# Patient Record
Sex: FEMALE | Race: WHITE | NOT HISPANIC OR LATINO | Employment: FULL TIME | URBAN - METROPOLITAN AREA
[De-identification: names, ages, dates, MRNs, and addresses within clinical notes are randomized per-mention and may not be internally consistent; named-entity substitution may affect disease eponyms.]

---

## 2024-07-29 ENCOUNTER — NURSE TRIAGE (OUTPATIENT)
Age: 39
End: 2024-07-29

## 2024-07-29 NOTE — TELEPHONE ENCOUNTER
"Patient calling reporting irregular bleeding 4-5 months now. LMP: 7/26/24. She states she goes through spotting to mild bleeding when she does bleed, soaking through 1-2 pads per day.  Instead of getting her menses x1 per month, she has been getting it more frequently for a shorter amount of time, menses lasting 2-4 days.  Reports irregular bleeding in between 10-14 days of her cycle. Denies any chance of pregnancy.  Advised patient bleeding/pain precautions.    Attempted to schedule an appointment to be further evaluated.  Unable to find an appt that fits patient's schedule.  Warm transferred patient to clerical staff to be further scheduled.  Patient verbalized understanding and voiced appreciation for phone call.       Reason for Disposition   Age > 38 years with irregular or excessive bleeding    Answer Assessment - Initial Assessment Questions  1. AMOUNT: \"Describe the bleeding that you are having.\"     - SPOTTING: spotting, or pinkish / brownish mucous discharge; does not fill panti-liner or pad     - MILD:  less than 1 pad / hour; less than patient's usual menstrual bleeding    - MODERATE: 1-2 pads / hour; 1 menstrual cup every 6 hours; small-medium blood clots (e.g., pea, grape, small coin)    - SEVERE: soaking 2 or more pads/hour for 2 or more hours; 1 menstrual cup every 2 hours; bleeding not contained by pads or continuous red blood from vagina; large blood clots (e.g., golf ball, large coin)       Spotting to mild   2. ONSET: \"When did the bleeding begin?\" \"Is it continuing now?\"      Every 2 weeks   3. MENSTRUAL PERIOD: \"When was the last normal menstrual period?\" \"How is this different than your period?\"      LMP: 7/26/24  4. REGULARITY: \"How regular are your periods?\"      YES  5. ABDOMINAL PAIN: \"Do you have any pain?\" \"How bad is the pain?\"  (e.g., Scale 1-10; mild, moderate, or severe)    - MILD (1-3): doesn't interfere with normal activities, abdomen soft and not tender to touch     - MODERATE " "(4-7): interferes with normal activities or awakens from sleep, tender to touch     - SEVERE (8-10): excruciating pain, doubled over, unable to do any normal activities       Denies   6. PREGNANCY: \"Could you be pregnant?\" \"Are you sexually active?\" \"Did you recently give birth?\"      Denies   7. BREASTFEEDING: \"Are you breastfeeding?\"      N/a   8. HORMONES: \"Are you taking any hormone medications, prescription or OTC?\" (e.g., birth control pills, estrogen)      Denies   9. BLOOD THINNERS: \"Do you take any blood thinners?\" (e.g., Coumadin/warfarin, Pradaxa/dabigatran, aspirin)      Denies   10. CAUSE: \"What do you think is causing the bleeding?\" (e.g., recent gyn surgery, recent gyn procedure; known bleeding disorder, cervical cancer, polycystic ovarian disease, fibroids)          PMMD   11. HEMODYNAMIC STATUS: \"Are you weak or feeling lightheaded?\" If Yes, ask: \"Can you stand and walk normally?\"         Denies   12. OTHER SYMPTOMS: \"What other symptoms are you having with the bleeding?\" (e.g., passed tissue, vaginal discharge, fever, menstrual-type cramps)        Denies    Protocols used: Vaginal Bleeding - Abnormal-ADULT-[place]    "

## 2024-07-31 DIAGNOSIS — R45.86 MOOD SWINGS: ICD-10-CM

## 2024-07-31 DIAGNOSIS — R45.4 ANGER REACTION: ICD-10-CM

## 2024-07-31 DIAGNOSIS — F33.0 MILD EPISODE OF RECURRENT MAJOR DEPRESSIVE DISORDER (HCC): ICD-10-CM

## 2024-07-31 RX ORDER — ESCITALOPRAM OXALATE 10 MG/1
10 TABLET ORAL DAILY
Qty: 90 TABLET | Refills: 1 | Status: SHIPPED | OUTPATIENT
Start: 2024-07-31

## 2024-07-31 NOTE — TELEPHONE ENCOUNTER
Reason for call:   [x] Refill   [] Prior Auth  [] Other:     Office:   [x] PCP/Provider - [facility] - Alyson PONCE   [] Specialty/Provider -     Medication: escitalopram (LEXAPRO) 10 mg tablet     Dose/Frequency: 10 mg, Daily     Quantity: 100     Pharmacy: [facility] #[id number]    Does the patient have enough for 3 days?   [x] Yes   [] No - Send as HP to POD

## 2024-08-30 ENCOUNTER — TELEPHONE (OUTPATIENT)
Dept: FAMILY MEDICINE CLINIC | Facility: CLINIC | Age: 39
End: 2024-08-30

## 2024-12-10 ENCOUNTER — TELEPHONE (OUTPATIENT)
Dept: FAMILY MEDICINE CLINIC | Facility: CLINIC | Age: 39
End: 2024-12-10

## 2025-01-22 ENCOUNTER — TELEPHONE (OUTPATIENT)
Dept: ADMINISTRATIVE | Facility: OTHER | Age: 40
End: 2025-01-22

## 2025-01-22 NOTE — TELEPHONE ENCOUNTER
----- Message from Mana CAREY sent at 1/21/2025  1:30 PM EST -----  Regarding: Care Gap request  01/21/25 1:30 PM    Hello, our patient attached above has had Pap Smear (HPV) aka Cervical Cancer Screening completed/performed. Please assist in updating the patient chart by pulling the Care Everywhere (CE) document. The date of service is 9/2024.     Thank you,  Mana Blair  [facility]

## 2025-01-22 NOTE — TELEPHONE ENCOUNTER
Upon review of the In Basket request we were able to locate, review, and update the patient chart as requested for Pap Smear (HPV) aka Cervical Cancer Screening.    Any additional questions or concerns should be emailed to the Practice Liaisons via the appropriate education email address, please do not reply via In Basket.    Thank you  NAYLA DRAPER MA   [facility]

## 2025-02-13 ENCOUNTER — TELEPHONE (OUTPATIENT)
Dept: FAMILY MEDICINE CLINIC | Facility: CLINIC | Age: 40
End: 2025-02-13

## 2025-02-13 NOTE — TELEPHONE ENCOUNTER
Patient attribution    Last seen 2022    Patient is now seen with Dr Lizbeth Grady , DO    Please update chart and remove our office as pcp

## 2025-03-28 ENCOUNTER — APPOINTMENT (OUTPATIENT)
Dept: RADIOLOGY | Facility: CLINIC | Age: 40
End: 2025-03-28
Payer: COMMERCIAL

## 2025-03-28 ENCOUNTER — OFFICE VISIT (OUTPATIENT)
Dept: URGENT CARE | Facility: CLINIC | Age: 40
End: 2025-03-28
Payer: COMMERCIAL

## 2025-03-28 VITALS
HEIGHT: 63 IN | WEIGHT: 230 LBS | OXYGEN SATURATION: 99 % | RESPIRATION RATE: 18 BRPM | BODY MASS INDEX: 40.75 KG/M2 | DIASTOLIC BLOOD PRESSURE: 82 MMHG | TEMPERATURE: 96.7 F | SYSTOLIC BLOOD PRESSURE: 126 MMHG | HEART RATE: 97 BPM

## 2025-03-28 DIAGNOSIS — M25.511 ACUTE PAIN OF RIGHT SHOULDER: Primary | ICD-10-CM

## 2025-03-28 DIAGNOSIS — M25.511 ACUTE PAIN OF RIGHT SHOULDER: ICD-10-CM

## 2025-03-28 PROCEDURE — 73030 X-RAY EXAM OF SHOULDER: CPT

## 2025-03-28 PROCEDURE — 99214 OFFICE O/P EST MOD 30 MIN: CPT

## 2025-03-28 RX ORDER — CYCLOBENZAPRINE HCL 5 MG
5 TABLET ORAL 3 TIMES DAILY PRN
Qty: 12 TABLET | Refills: 0 | Status: SHIPPED | OUTPATIENT
Start: 2025-03-28

## 2025-03-28 NOTE — PROGRESS NOTES
[facility]        NAME: Meri Phillips is a 39 y.o. female  : 1985    MRN: [id number]  DATE: 2025  TIME: 9:28 AM    Assessment and Plan   Acute pain of right shoulder [M25.511]  1. Acute pain of right shoulder  XR shoulder 2+ vw right    cyclobenzaprine (FLEXERIL) 5 mg tablet    Ambulatory Referral to Physical Therapy    Ambulatory Referral to Orthopedic Surgery        Preliminary XR read negative for acute osseous abnormality, final read pending. Muscle relaxer, caution advised. Encouraged continued supportive measures.  Referral placed to PT and Orthopedic Surgery. Follow up with PCP in 3-5 days or proceed to emergency department for worsening symptoms.  Patient verbalized understanding of instructions given.       Patient Instructions     Preliminary XR read negative, final read pending  Take muscle relaxer as prescribed but do not drink alcohol, drive your vehicle, or operate heavy machinery  OTC Tylenol/Ibuprofen  Heat/Ice  Topical analgesics  Referral placed to PT and Orthopedic Surgery   Follow up with PCP in 3-5 days.  Proceed to  ER if symptoms worsen.    If tests have been performed at [facility], our office will contact you with results if changes need to be made to the care plan discussed with you at the visit.  You can review your full results on [facility] MyChart.    Chief Complaint     Chief Complaint   Patient presents with    Shoulder Pain     Right shoulder pain. No injury. Hurts more with Movement         History of Present Illness       39-year-old female with no significant past medical history presents with complaints of right posterior shoulder pain x 1 week.  Denies specific known injury or trauma but reports waking up with symptoms.  Reports gradual worsening and pain only occurs with movement and exertion.  Unable to perform overhead movements.  Denies numbness, tingling, or weakness.  She is right-hand dominant.  She has been taking OTC Tylenol and applying  ice.    Shoulder Pain   Pertinent negatives include no fever or numbness.       Review of Systems   Review of Systems   Constitutional:  Negative for chills and fever.   Respiratory:  Negative for cough and shortness of breath.    Cardiovascular:  Negative for chest pain.   Gastrointestinal:  Negative for abdominal pain, diarrhea, nausea and vomiting.   Musculoskeletal:  Positive for arthralgias and myalgias. Negative for joint swelling.   Skin:  Negative for rash.   Neurological:  Negative for weakness and numbness.         Current Medications       Current Outpatient Medications:     cyclobenzaprine (FLEXERIL) 5 mg tablet, Take 1 tablet (5 mg total) by mouth 3 (three) times a day as needed for muscle spasms, Disp: 12 tablet, Rfl: 0    escitalopram (LEXAPRO) 10 mg tablet, Take 1 tablet (10 mg total) by mouth daily, Disp: 90 tablet, Rfl: 1    FeroSul 325 (65 Fe) MG tablet, take 1 tablet by mouth twice a day before meals, Disp: 60 tablet, Rfl: 2    naproxen (Naprosyn) 500 mg tablet, Take 1 tablet (500 mg total) by mouth 2 (two) times a day with meals (Patient not taking: Reported on 3/28/2025), Disp: 30 tablet, Rfl: 0    oxyCODONE-acetaminophen (PERCOCET) 5-325 mg per tablet, Take 1 tablet by mouth every 4 (four) hours as needed for moderate pain for up to 15 doses Max Daily Amount: 6 tablets (Patient not taking: Reported on 3/28/2025), Disp: 15 tablet, Rfl: 0    Current Allergies     Allergies as of 2025    (No Known Allergies)            The following portions of the patient's history were reviewed and updated as appropriate: allergies, current medications, past family history, past medical history, past social history, past surgical history and problem list.     Past Medical History:   Diagnosis Date    Depression     Iron deficiency     Migraines     Strep throat        Past Surgical History:   Procedure Laterality Date     SECTION      DILATION AND CURETTAGE OF UTERUS      x4    GALLBLADDER SURGERY  "        Family History   Problem Relation Age of Onset    Hypertension Father     Heart disease Father          Medications have been verified.        Objective   /82   Pulse 97   Temp (!) 96.7 °F (35.9 °C)   Resp 18   Ht 5' 3\" (1.6 m)   Wt 104 kg (230 lb)   SpO2 99%   BMI 40.74 kg/m²   No LMP recorded. Patient has had an ablation.       Physical Exam     Physical Exam  Vitals and nursing note reviewed.   Constitutional:       General: She is not in acute distress.     Appearance: She is not toxic-appearing.   HENT:      Head: Normocephalic.   Eyes:      Conjunctiva/sclera: Conjunctivae normal.   Pulmonary:      Effort: Pulmonary effort is normal.   Musculoskeletal:         General: Tenderness present. No swelling.      Right shoulder: Tenderness present. No swelling or bony tenderness. Decreased range of motion. Normal strength.      Right upper arm: Normal.      Right elbow: Normal.      Right forearm: Normal.      Right wrist: Normal. Normal pulse.      Right hand: Normal. Normal strength.      Cervical back: No tenderness.      Thoracic back: No tenderness.      Comments: TTP over right posterior shoulder that radiates into right triceps muscle. No ecchymosis or swelling. Mildly reduced ROM, pain with overhead movements and internal/external rotation.    Skin:     General: Skin is warm and dry.   Neurological:      Mental Status: She is alert and oriented to person, place, and time.      Sensory: Sensation is intact.      Motor: Motor function is intact.      Gait: Gait is intact.   Psychiatric:         Mood and Affect: Mood normal.         Behavior: Behavior normal.                   "

## 2025-03-28 NOTE — PATIENT INSTRUCTIONS
Preliminary XR read negative, final read pending  Take muscle relaxer as prescribed but do not drink alcohol, drive your vehicle, or operate heavy machinery  OTC Tylenol/Ibuprofen  Heat/Ice  Topical analgesics  Referral placed to PT and Orthopedic Surgery   Follow up with PCP in 3-5 days.  Proceed to  ER if symptoms worsen.    If tests have been performed at [facility], our office will contact you with results if changes need to be made to the care plan discussed with you at the visit.  You can review your full results on [facility] MyChart.    Chief Complaint

## 2025-08-22 ENCOUNTER — TELEPHONE (OUTPATIENT)
Dept: FAMILY MEDICINE CLINIC | Facility: CLINIC | Age: 40
End: 2025-08-22